# Patient Record
Sex: MALE | Race: WHITE
[De-identification: names, ages, dates, MRNs, and addresses within clinical notes are randomized per-mention and may not be internally consistent; named-entity substitution may affect disease eponyms.]

---

## 2017-04-28 ENCOUNTER — HOSPITAL ENCOUNTER (EMERGENCY)
Dept: HOSPITAL 25 - UCEAST | Age: 67
Discharge: HOME | End: 2017-04-28
Payer: MEDICARE

## 2017-04-28 VITALS — SYSTOLIC BLOOD PRESSURE: 143 MMHG | DIASTOLIC BLOOD PRESSURE: 65 MMHG

## 2017-04-28 DIAGNOSIS — M25.511: Primary | ICD-10-CM

## 2017-04-28 DIAGNOSIS — Z88.0: ICD-10-CM

## 2017-04-28 PROCEDURE — G0463 HOSPITAL OUTPT CLINIC VISIT: HCPCS

## 2017-04-28 PROCEDURE — 99202 OFFICE O/P NEW SF 15 MIN: CPT

## 2017-04-28 NOTE — UC
Upper Extremity HPI





- HPI Summary


HPI Summary: 


66M presents with right shoulder pain for two weeks. He initially was lifting 

something from the ground when he felt a pull on his He states that the pain 

has been getting worst. He admits to pain greatest over his anterior shoulder. 

He states that his shoulder feels weaker.   He has to use his other hand to 

lift his shoulder above 90 degrees. 








- History of Current Complaint


Chief Complaint: UCUpperExtremity


Stated Complaint: SHOULDER PAIN


Time Seen by Provider: 04/28/17 14:51





- Allergies/Home Medications


Allergies/Adverse Reactions: 


 Allergies











Allergy/AdvReac Type Severity Reaction Status Date / Time


 


Penicillins Allergy  HIVES, Verified 01/15/13 06:49





   ANAPHYLACTIC  














PMH/Surg Hx/FS Hx/Imm Hx


Endocrine History Of: 


   Denies: Diabetes


Cardiovascular History Of: Reports: Cardiac Disorders


   Denies: Hypertension


Respiratory History Of: 


   Denies: COPD


Neurological History Of: 


   Denies: CVA, Dementia, Seizures





- Surgical History


Surgical History: Yes


Surgery Procedure, Year, and Place: 1971 TONSILLECTOMY, [place].  1995 -1999 NASAL 

[place] [facility] [place] [facility] [place]  [id number] 

SEVERAL HEMORRHOIDECTOMY,





- Family History


Known Family History: Positive: Cardiac Disease





- Social History


Alcohol Use: None


Substance Use Type: None


Smoking Status (MU): Never Smoked Tobacco





- Immunization History


Most Recent Tetanus Shot: 2010





Review of Systems


Constitutional: Negative


Respiratory: Negative


Cardiovascular: Negative


Motor: Decreased ROM - right shoulder


All Other Systems Reviewed And Are Negative: Yes





Physical Exam


Triage Information Reviewed: Yes


Vital Signs: 


 Initial Vital Signs











Temp  98.2 F   04/28/17 14:22


 


Pulse  62   04/28/17 14:22


 


Resp  16   04/28/17 14:22


 


BP  143/65   04/28/17 14:22


 


Pulse Ox  99   04/28/17 14:22











Vital Signs Reviewed: Yes


Eyes: Positive: Conjunctiva Clear


Respiratory: Positive: Lungs clear, Normal breath sounds


Cardiovascular: Positive: RRR


Musculoskeletal: Positive: ROM Limited @ - right shoulder with extension and 

unable to flex above 90 degrees without assistance, Other: - good pulses, 

capillary refill < 2secs, neg drop arm test and empty can, neg speed sign, neg 

murray





Upper Extremity Course/Dx





- Course


Course Of Treatment: 66M presents with right shoulder injury two weeks ago. 

occurred while lifting an object. Felt a pulling sensation in his anterior 

shoulder. pain has not gotten any better. now is feeling weakness of area. on 

exam neg drop arm or empty can test, speed test. has weakness with drop arm 

though but not complete drop so suspect rotator cuff injury. had xray done a 

couple days ago and showed osteoarthirits. told patient to follow up with ortho 

and may benefit from PT. patient understands and agrees with plan





- Differential Dx/Diagnosis


Differential Diagnosis/HQI/PQRI: Fracture (Closed), Strain, Sprain


Provider Diagnoses: right shoulder pain





Discharge





- Discharge Plan


Condition: Good


Disposition: HOME


Patient Education Materials:  Rotator Cuff Injury (ED)


Referrals: 


Abbe Leyva MD [Medical Doctor] - 


No Primary Care Phys,NOPCP [Primary Care Provider] - 


Additional Instructions: 


Take Tylenol and ibuprofen as needed every 6-8 hours for pain


Ice/heat area


do ROM as tolerated of area


Follow up with ortho


Return to UC if develop any new or worsening symptoms

## 2020-02-04 ENCOUNTER — HOSPITAL ENCOUNTER (EMERGENCY)
Dept: HOSPITAL 25 - UCEAST | Age: 70
Discharge: HOME | End: 2020-02-04
Payer: OTHER GOVERNMENT

## 2020-02-04 VITALS — DIASTOLIC BLOOD PRESSURE: 66 MMHG | SYSTOLIC BLOOD PRESSURE: 142 MMHG

## 2020-02-04 DIAGNOSIS — R04.0: Primary | ICD-10-CM

## 2020-02-04 DIAGNOSIS — Z88.0: ICD-10-CM

## 2020-02-04 DIAGNOSIS — G47.00: ICD-10-CM

## 2020-02-04 PROCEDURE — 99212 OFFICE O/P EST SF 10 MIN: CPT

## 2020-02-04 PROCEDURE — G0463 HOSPITAL OUTPT CLINIC VISIT: HCPCS

## 2020-02-04 NOTE — UC
Epistaxis Nasal HPI





- HPI Summary


HPI Summary: 





68 yo male presents with nosebleeds. He tells me that over the last few weeks 

he has been having "random" nosebleeds a few times a week. One day last week he 

had a nosebleed 5 times in one day. Each time they stop within 15minutes with 

nasal packing -- always from the left nare. He has a hx of sinus surgery, but 

has not seen ENT for his nosebleeds. He does not take any blood thinning 

medications. He denies trauma, headache, dizziness. 





- History of Current Complaint


Chief Complaint: UCGeneralIllness


Stated Complaint: NOSE BLEED


Time Seen by Provider: 02/04/20 11:55


Hx Obtained From: Patient


Onset/Duration: Gradual Onset


Severity Currently: None


Pain Intensity: 0





- Allergies/Home Medications


Allergies/Adverse Reactions: 


 Allergies











Allergy/AdvReac Type Severity Reaction Status Date / Time


 


MS Penicillins [Penicillins] Allergy  HIVES, Verified 02/04/20 11:54





   ANAPHYLACTIC  


 


Penicillins Allergy  anaph Verified 02/04/20 11:54














PMH/Surg Hx/FS Hx/Imm Hx





- Additional Past Medical History


Additional PMH: 





Insomnia


GI/ History: Gastroesophageal Reflux





- Surgical History


Surgical History: Yes


Surgery Procedure, Year, and Place: RT KNEE REPLACEMENT X2; RT HIP REPLACEMENT; 

SINUS SURGERY 20 YRS AGO.  ***Patient states had shrapnel injury to torso....Dr Perez looked at chest xrays from 2004 and 2006 and said nothing shows up there 

as far as shrapnel, ok to scan.





- Family History


Known Family History: Positive: Cardiac Disease





- Social History


Lives: With Family


Alcohol Use: None


Substance Use Type: None


Smoking Status (MU): Never Smoked Tobacco





- Immunization History


Most Recent Tetanus Shot: 2010





Review of Systems


All Other Systems Reviewed And Are Negative: No


Constitutional: Positive: Negative


Skin: Positive: Negative


Eyes: Positive: Negative


ENT: Positive: Epistaxis


Respiratory: Positive: Negative


Cardiovascular: Positive: Negative


Neurological: Positive: Negative


Psychological: Positive: Negative





Physical Exam





- Summary


Physical Exam Summary: 





 


GENERAL: NAD. WDWN. No pain distress.


SKIN: No rashes, sores, lesions, or open wounds.


HEENT:


            Head: AT/NC


            Eyes: EOM intact. Conjunctiva clear without inflammation or 

discharge.


            Ears: Hearing grossly normal. TMs intact, no bulging, erythema, or 

edema. 


            Nose: Nasal mucosa pink and moist with dried blood in left nare. 

NTTP maxillary and frontal sinus. 


            Throat: Posterior oropharynx without exudates, erythema, or 

tonsillar enlargement.  Uvula midline.


NECK: Supple. Nontender. No lymphadenopathy. 


NEURO: Alert. 


PSYCH: Age appropriate behavior.


Triage Information Reviewed: Yes


Vital Signs: 


 Initial Vital Signs











Temp  97.1 F   02/04/20 11:49


 


Pulse  83   02/04/20 11:49


 


Resp  16   02/04/20 11:49


 


BP  142/66   02/04/20 11:49


 


Pulse Ox  99   02/04/20 11:49











Vital Signs Reviewed: Yes





Epistaxis Nasal Course/Dx





- Course


Course Of Treatment: 





Nosebleed today stopped with manual packing in <30minutes. 


Instilled one spray of afrin to left nare and advised to use this for 2 days.


Referral to ENT





- Differential Dx/Diagnosis


Provider Diagnosis: 


 Epistaxis








Discharge ED





- Sign-Out/Discharge


Documenting (check all that apply): Patient Departure


All imaging exams completed and their final reports reviewed: No Studies





- Discharge Plan


Condition: Stable


Disposition: HOME


Patient Education Materials:  Nosebleed (ED)


Referrals: 


Elliott Quiñones MD [Primary Care Provider] - 


Félix Mac MD [Medical Doctor] - As Soon As Possible


Additional Instructions: 


If you develop a fever, shortness of breath, chest pain, new or worsening 

symptoms - please call your PCP or go to the ED immediately.


 


Please call the Ear, Nose, and Throat doctor at the number below to schedule an 

appointment for your recurring nosebleeds.





- Billing Disposition and Condition


Condition: STABLE


Disposition: Home